# Patient Record
Sex: FEMALE | Race: OTHER | HISPANIC OR LATINO | Employment: FULL TIME | URBAN - METROPOLITAN AREA
[De-identification: names, ages, dates, MRNs, and addresses within clinical notes are randomized per-mention and may not be internally consistent; named-entity substitution may affect disease eponyms.]

---

## 2019-02-13 ENCOUNTER — HOSPITAL ENCOUNTER (EMERGENCY)
Facility: HOSPITAL | Age: 54
Discharge: HOME | End: 2019-02-13
Attending: EMERGENCY MEDICINE
Payer: COMMERCIAL

## 2019-02-13 VITALS
RESPIRATION RATE: 18 BRPM | HEART RATE: 84 BPM | OXYGEN SATURATION: 100 % | DIASTOLIC BLOOD PRESSURE: 70 MMHG | TEMPERATURE: 98 F | SYSTOLIC BLOOD PRESSURE: 127 MMHG

## 2019-02-13 DIAGNOSIS — L50.9 URTICARIA: Primary | ICD-10-CM

## 2019-02-13 PROCEDURE — 99282 EMERGENCY DEPT VISIT SF MDM: CPT

## 2019-02-13 RX ORDER — PREDNISONE 20 MG/1
60 TABLET ORAL ONCE
Status: COMPLETED | OUTPATIENT
Start: 2019-02-13 | End: 2019-02-13

## 2019-02-13 RX ORDER — FAMOTIDINE 20 MG/1
20 TABLET, FILM COATED ORAL ONCE
Status: COMPLETED | OUTPATIENT
Start: 2019-02-13 | End: 2019-02-13

## 2019-02-13 RX ORDER — PREDNISONE 20 MG/1
TABLET ORAL
Qty: 12 TABLET | Refills: 0 | Status: SHIPPED | OUTPATIENT
Start: 2019-02-14

## 2019-02-13 RX ADMIN — PREDNISONE 60 MG: 20 TABLET ORAL at 15:04

## 2019-02-13 RX ADMIN — FAMOTIDINE 20 MG: 20 TABLET ORAL at 15:04

## 2019-02-13 NOTE — ED PROVIDER NOTES
History  Chief Complaint   Patient presents with    Rash     reports yesterday started with generalized diffuse full body rash  Denies known allergens or contact with potential allergens  No airway compromise  Denies shortness of breath or chest tightness  61-year-old female presents to the ER with generalized diffuse itchy rash that started yesterday  Patient states that the rash started at her hairline and has since progressed all over her body  Patient states that the rash is itchy but denies any pain  Patient denies fevers, chills, nausea, vomiting  Patient denies any known allergens including any new soaps, detergents, perfumes, lotions, creams, foods  Patient states that she has eating foods that she has made herself and is unsure where the rash started from  Patient states that she has tried Benadryl without improvement  Patient denies any tongue swelling, difficulty breathing, difficulty swallowing, throat tightening, chest tightening, difficulty breathing, shortness of breath, wheezing, chest pain  None       History reviewed  No pertinent past medical history  Past Surgical History:   Procedure Laterality Date     SECTION      HYSTERECTOMY         History reviewed  No pertinent family history  I have reviewed and agree with the history as documented  Social History     Tobacco Use    Smoking status: Never Smoker    Smokeless tobacco: Never Used   Substance Use Topics    Alcohol use: Never     Frequency: Never    Drug use: Never        Review of Systems   Constitutional: Negative for chills and fever  HENT: Negative for drooling, ear pain, sore throat and trouble swallowing  Eyes: Negative for photophobia, pain, redness and itching  Respiratory: Negative for chest tightness, shortness of breath and wheezing  Cardiovascular: Negative for chest pain and palpitations  Gastrointestinal: Negative for abdominal pain, nausea and vomiting     Musculoskeletal: Negative for arthralgias, myalgias, neck pain and neck stiffness  Skin: Positive for rash  Neurological: Negative for dizziness, weakness, light-headedness, numbness and headaches  All other systems reviewed and are negative  Physical Exam  Physical Exam   Constitutional: She appears well-developed and well-nourished  She appears distressed (Actively scratching in exam room)  HENT:   Head: Normocephalic and atraumatic  Right Ear: Tympanic membrane normal    Left Ear: Tympanic membrane normal    Nose: Nose normal    Mouth/Throat: Uvula is midline, oropharynx is clear and moist and mucous membranes are normal  No trismus in the jaw  Eyes: Conjunctivae are normal    Neck: Normal range of motion  Cardiovascular: Normal rate, regular rhythm, normal heart sounds and intact distal pulses  Pulmonary/Chest: Effort normal and breath sounds normal  No stridor  Musculoskeletal: Normal range of motion  Neurological: She is alert  Skin: Skin is warm and dry  Capillary refill takes less than 2 seconds  Rash noted  Rash is urticarial ( diffuse in hair, neck, chest, back, abdomen, arms, legs)  She is not diaphoretic  Nursing note and vitals reviewed        Vital Signs  ED Triage Vitals [02/13/19 1306]   Temperature Pulse Respirations Blood Pressure SpO2   98 °F (36 7 °C) 84 18 127/70 100 %      Temp src Heart Rate Source Patient Position - Orthostatic VS BP Location FiO2 (%)   -- Monitor Sitting -- --      Pain Score       No Pain           Vitals:    02/13/19 1306   BP: 127/70   Pulse: 84   Patient Position - Orthostatic VS: Sitting       Visual Acuity      ED Medications  Medications   predniSONE tablet 60 mg (60 mg Oral Given 2/13/19 1504)   famotidine (PEPCID) tablet 20 mg (20 mg Oral Given 2/13/19 1504)       Diagnostic Studies  Results Reviewed     None                 No orders to display              Procedures  Procedures       Phone Contacts  ED Phone Contact    ED Course MDM  Number of Diagnoses or Management Options  Urticaria: new and does not require workup  Patient Progress  Patient progress: stable      Disposition  Final diagnoses:   Urticaria     Time reflects when diagnosis was documented in both MDM as applicable and the Disposition within this note     Time User Action Codes Description Comment    2/13/2019  3:02 PM Jimenez Nikky Add [L50 9] Urticaria       ED Disposition     ED Disposition Condition Date/Time Comment    Discharge Stable Wed Feb 13, 2019  3:02 PM Jeanie Weaver discharge to home/self care  Follow-up Information     Follow up With Specialties Details Why Contact Info    your family doctor  Call  For Recheck, If symptoms worsen, For further evaluation of symptoms           Discharge Medication List as of 2/13/2019  3:06 PM      START taking these medications    Details   predniSONE 20 mg tablet Take 3 tablets once a day for 2 days, then 2 tablets for 2 days, then 1 tablet for 2 days  Take in the morning with food  , Print           No discharge procedures on file      ED Provider  Electronically Signed by           Melissa Vann PA-C  02/25/19 6915

## 2019-02-13 NOTE — DISCHARGE INSTRUCTIONS
Keep area clean and dry  Can use cool compresses to decrease itching  Heat can make areas itch more  Can use over-the-counter creams and lotions as needed  Take prednisone in the morning with food  Wash hands often and try to avoid scratching at areas  Can use over-the-counter creams and lotions (like Benadryl, Aveeno, Calamine) to help with symptoms

## 2019-02-17 ENCOUNTER — HOSPITAL ENCOUNTER (EMERGENCY)
Facility: HOSPITAL | Age: 54
Discharge: HOME | End: 2019-02-17
Attending: EMERGENCY MEDICINE | Admitting: EMERGENCY MEDICINE
Payer: COMMERCIAL

## 2019-02-17 VITALS
TEMPERATURE: 98.4 F | RESPIRATION RATE: 12 BRPM | OXYGEN SATURATION: 100 % | DIASTOLIC BLOOD PRESSURE: 64 MMHG | HEART RATE: 64 BPM | SYSTOLIC BLOOD PRESSURE: 127 MMHG | WEIGHT: 166 LBS

## 2019-02-17 DIAGNOSIS — L50.9 URTICARIA: Primary | ICD-10-CM

## 2019-02-17 PROCEDURE — 99282 EMERGENCY DEPT VISIT SF MDM: CPT

## 2019-02-17 PROCEDURE — 96372 THER/PROPH/DIAG INJ SC/IM: CPT

## 2019-02-17 RX ORDER — EPINEPHRINE 1 MG/ML
0.3 INJECTION, SOLUTION, CONCENTRATE INTRAVENOUS ONCE
Status: COMPLETED | OUTPATIENT
Start: 2019-02-17 | End: 2019-02-17

## 2019-02-17 RX ORDER — HYDROXYZINE PAMOATE 25 MG/1
25 CAPSULE ORAL 3 TIMES DAILY PRN
Qty: 15 CAPSULE | Refills: 0 | Status: SHIPPED | OUTPATIENT
Start: 2019-02-17

## 2019-02-17 RX ADMIN — EPINEPHRINE 0.3 MG: 1 INJECTION, SOLUTION, CONCENTRATE INTRAVENOUS at 15:21

## 2019-02-17 NOTE — ED NOTES
Per Dr Hernandez Dey, pt okay to be discharged to home at this time        Yvonne John, KURTIS  02/17/19 5498

## 2019-02-17 NOTE — ED PROVIDER NOTES
History  Chief Complaint   Patient presents with    Urticaria     seen in ED 19 for hives, prescribed prednisone, reports no improvement in hives  reports dizziness yesterday and tiredness "I took those pink pills for allergies" and woke up tired  53-year-old female with no past medical history presents to the emergency department with ongoing hives  She was seen here 4 days ago for the same complaint  She denies any new medications, soaps, foods, etc   No trouble swallowing or shortness of breath  No recent illness  She has been taking Benadryl and prednisone without relief  Urticaria   Location:  Generalized  Severity:  Unable to specify  Onset quality:  Gradual  Duration:  5 days  Timing:  Constant  Progression:  Unchanged  Chronicity:  New  Ineffective treatments:  Benadryl and prednisone  Associated symptoms: rash    Associated symptoms: no abdominal pain, no chest pain, no congestion, no cough, no diarrhea, no ear pain, no fatigue, no fever, no headaches, no loss of consciousness, no myalgias, no nausea, no rhinorrhea, no shortness of breath, no sore throat, no vomiting and no wheezing        Prior to Admission Medications   Prescriptions Last Dose Informant Patient Reported? Taking?   predniSONE 20 mg tablet   No No   Sig: Take 3 tablets once a day for 2 days, then 2 tablets for 2 days, then 1 tablet for 2 days  Take in the morning with food  Facility-Administered Medications: None       History reviewed  No pertinent past medical history  Past Surgical History:   Procedure Laterality Date     SECTION      HYSTERECTOMY         History reviewed  No pertinent family history  I have reviewed and agree with the history as documented  Social History     Tobacco Use    Smoking status: Never Smoker    Smokeless tobacco: Never Used   Substance Use Topics    Alcohol use: Never     Frequency: Never    Drug use: Never        Review of Systems   Constitutional: Negative  Negative for chills, diaphoresis, fatigue and fever  HENT: Negative  Negative for congestion, ear pain, rhinorrhea and sore throat  Eyes: Negative  Negative for discharge, redness and itching  Respiratory: Negative  Negative for apnea, cough, chest tightness, shortness of breath and wheezing  Cardiovascular: Negative for chest pain, palpitations and leg swelling  Gastrointestinal: Negative  Negative for abdominal pain, diarrhea, nausea and vomiting  Endocrine: Negative  Genitourinary: Negative  Negative for flank pain, frequency and urgency  Musculoskeletal: Negative  Negative for back pain and myalgias  Skin: Positive for rash  Allergic/Immunologic: Negative  Neurological: Negative  Negative for dizziness, loss of consciousness, syncope, weakness, light-headedness, numbness and headaches  Hematological: Negative  All other systems reviewed and are negative  Physical Exam  Physical Exam   Constitutional: She is oriented to person, place, and time  She appears well-developed and well-nourished  Non-toxic appearance  She does not have a sickly appearance  She does not appear ill  No distress  HENT:   Head: Normocephalic and atraumatic  Right Ear: External ear normal    Left Ear: External ear normal    Nose: Nose normal    Mouth/Throat: Oropharynx is clear and moist  No uvula swelling  No oropharyngeal exudate  Eyes: Pupils are equal, round, and reactive to light  Conjunctivae and EOM are normal  Right eye exhibits no discharge  Left eye exhibits no discharge  No scleral icterus  Neck: Normal range of motion and phonation normal  Neck supple  No JVD present  No tracheal deviation present  No thyromegaly present  Cardiovascular: Normal rate, regular rhythm and normal heart sounds  Exam reveals no gallop and no friction rub  No murmur heard  Pulmonary/Chest: Effort normal and breath sounds normal  No stridor  No respiratory distress  She has no wheezes   She has no rales  She exhibits no tenderness  Abdominal: Soft  Bowel sounds are normal  She exhibits no distension and no mass  There is no tenderness  No hernia  Lymphadenopathy:     She has no cervical adenopathy  Neurological: She is alert and oriented to person, place, and time  She has normal reflexes  She displays normal reflexes  She exhibits normal muscle tone  Skin: Skin is warm and dry  Rash noted  Rash is urticarial  She is not diaphoretic  No erythema  No pallor  Psychiatric: She has a normal mood and affect  Nursing note and vitals reviewed  Vital Signs  ED Triage Vitals [02/17/19 1456]   Temperature Pulse Respirations Blood Pressure SpO2   98 4 °F (36 9 °C) 76 18 118/61 100 %      Temp Source Heart Rate Source Patient Position - Orthostatic VS BP Location FiO2 (%)   Temporal Monitor Sitting Right arm --      Pain Score       --           Vitals:    02/17/19 1456   BP: 118/61   Pulse: 76   Patient Position - Orthostatic VS: Sitting       Visual Acuity      ED Medications  Medications   EPINEPHrine PF (ADRENALIN) 1 mg/mL injection 0 3 mg (0 3 mg Intramuscular Given 2/17/19 1521)       Diagnostic Studies  Results Reviewed     None                 No orders to display              Procedures  Procedures       Phone Contacts  ED Phone Contact    ED Course                               MDM  Number of Diagnoses or Management Options  Diagnosis management comments: 53-year-old female presents with ongoing urticaria unknown cause  She appears well on exam   Will try a epinephrine for her symptoms and try Vistaril for her itching    Patient will be stable for discharge      Disposition  Final diagnoses:   Urticaria     Time reflects when diagnosis was documented in both MDM as applicable and the Disposition within this note     Time User Action Codes Description Comment    2/17/2019  3:28 PM Ronal MIRANDA Add [E60 9] Urticaria       ED Disposition     ED Disposition Condition Date/Time Comment Discharge Good Sun Feb 17, 2019  3:28 PM Saw Shyann discharge to home/self care  Follow-up Information     Follow up With Specialties Details Why Contact Info Additional [address] [facility] Schedule an appointment as soon as possible for a visit in 2 days If symptoms worsen [address] [facility] [address]  [address] [place] [address],[address], [address]          Patient's Medications   Discharge Prescriptions    HYDROXYZINE PAMOATE (VISTARIL) 25 MG CAPSULE    Take 1 capsule (25 mg total) by mouth 3 (three) times a day as needed for itching       Start Date: 2/17/2019 End Date: --       Order Dose: 25 mg       Quantity: 15 capsule    Refills: 0     No discharge procedures on file      ED Provider  Electronically Signed by           Brii Hernandez,   02/17/19 [address], DO  02/17/19 0700